# Patient Record
Sex: FEMALE | Race: WHITE | Employment: UNEMPLOYED | URBAN - METROPOLITAN AREA
[De-identification: names, ages, dates, MRNs, and addresses within clinical notes are randomized per-mention and may not be internally consistent; named-entity substitution may affect disease eponyms.]

---

## 2020-10-28 ENCOUNTER — APPOINTMENT (OUTPATIENT)
Dept: GENERAL RADIOLOGY | Facility: CLINIC | Age: 60
End: 2020-10-28
Payer: MEDICAID

## 2020-10-28 ENCOUNTER — HOSPITAL ENCOUNTER (EMERGENCY)
Facility: CLINIC | Age: 60
Discharge: HOME | End: 2020-10-28
Attending: EMERGENCY MEDICINE
Payer: MEDICAID

## 2020-10-28 VITALS
BODY MASS INDEX: 27.31 KG/M2 | RESPIRATION RATE: 14 BRPM | HEIGHT: 67 IN | SYSTOLIC BLOOD PRESSURE: 144 MMHG | TEMPERATURE: 98.4 F | WEIGHT: 174 LBS | HEART RATE: 88 BPM | DIASTOLIC BLOOD PRESSURE: 98 MMHG | OXYGEN SATURATION: 99 %

## 2020-10-28 LAB
ABSOLUTE EOS #: 0.1 K/UL (ref 0–0.4)
ABSOLUTE IMMATURE GRANULOCYTE: NORMAL K/UL (ref 0–0.3)
ABSOLUTE LYMPH #: 2.7 K/UL (ref 1–4.8)
ABSOLUTE MONO #: 0.5 K/UL (ref 0.1–1.2)
ANION GAP SERPL CALCULATED.3IONS-SCNC: 11 MMOL/L (ref 9–17)
BASOPHILS # BLD: 0 % (ref 0–2)
BASOPHILS ABSOLUTE: 0 K/UL (ref 0–0.2)
BUN BLDV-MCNC: 23 MG/DL (ref 8–23)
BUN/CREAT BLD: ABNORMAL (ref 9–20)
CALCIUM SERPL-MCNC: 9.2 MG/DL (ref 8.6–10.4)
CHLORIDE BLD-SCNC: 106 MMOL/L (ref 98–107)
CK MB: 3.6 NG/ML
CO2: 22 MMOL/L (ref 20–31)
CREAT SERPL-MCNC: 0.6 MG/DL (ref 0.5–0.9)
DIFFERENTIAL TYPE: NORMAL
EOSINOPHILS RELATIVE PERCENT: 1 % (ref 1–4)
GFR AFRICAN AMERICAN: >60 ML/MIN
GFR NON-AFRICAN AMERICAN: >60 ML/MIN
GFR SERPL CREATININE-BSD FRML MDRD: ABNORMAL ML/MIN/{1.73_M2}
GFR SERPL CREATININE-BSD FRML MDRD: ABNORMAL ML/MIN/{1.73_M2}
GLUCOSE BLD-MCNC: 102 MG/DL (ref 70–99)
HCT VFR BLD CALC: 40.5 % (ref 36–46)
HEMOGLOBIN: 13.5 G/DL (ref 12–16)
IMMATURE GRANULOCYTES: NORMAL %
LYMPHOCYTES # BLD: 35 % (ref 24–44)
MCH RBC QN AUTO: 31 PG (ref 26–34)
MCHC RBC AUTO-ENTMCNC: 33.3 G/DL (ref 31–37)
MCV RBC AUTO: 93.1 FL (ref 80–100)
MONOCYTES # BLD: 6 % (ref 2–11)
MYOGLOBIN: 22 NG/ML (ref 25–58)
NRBC AUTOMATED: NORMAL PER 100 WBC
PDW BLD-RTO: 12.6 % (ref 12.5–15.4)
PLATELET # BLD: 225 K/UL (ref 140–450)
PLATELET ESTIMATE: NORMAL
PMV BLD AUTO: 6.8 FL (ref 6–12)
POTASSIUM SERPL-SCNC: 3.7 MMOL/L (ref 3.7–5.3)
RBC # BLD: 4.35 M/UL (ref 4–5.2)
RBC # BLD: NORMAL 10*6/UL
SEG NEUTROPHILS: 58 % (ref 36–66)
SEGMENTED NEUTROPHILS ABSOLUTE COUNT: 4.4 K/UL (ref 1.8–7.7)
SODIUM BLD-SCNC: 139 MMOL/L (ref 135–144)
TOTAL CK: 64 U/L (ref 26–192)
TROPONIN INTERP: NORMAL
TROPONIN T: NORMAL NG/ML
TROPONIN, HIGH SENSITIVITY: 9 NG/L (ref 0–14)
WBC # BLD: 7.7 K/UL (ref 3.5–11)
WBC # BLD: NORMAL 10*3/UL

## 2020-10-28 PROCEDURE — 71045 X-RAY EXAM CHEST 1 VIEW: CPT

## 2020-10-28 PROCEDURE — 82553 CREATINE MB FRACTION: CPT

## 2020-10-28 PROCEDURE — 93005 ELECTROCARDIOGRAM TRACING: CPT | Performed by: EMERGENCY MEDICINE

## 2020-10-28 PROCEDURE — 83874 ASSAY OF MYOGLOBIN: CPT

## 2020-10-28 PROCEDURE — 36415 COLL VENOUS BLD VENIPUNCTURE: CPT

## 2020-10-28 PROCEDURE — 99284 EMERGENCY DEPT VISIT MOD MDM: CPT

## 2020-10-28 PROCEDURE — 85025 COMPLETE CBC W/AUTO DIFF WBC: CPT

## 2020-10-28 PROCEDURE — 80048 BASIC METABOLIC PNL TOTAL CA: CPT

## 2020-10-28 PROCEDURE — 82550 ASSAY OF CK (CPK): CPT

## 2020-10-28 PROCEDURE — 84484 ASSAY OF TROPONIN QUANT: CPT

## 2020-10-28 RX ORDER — TRAMADOL HYDROCHLORIDE 50 MG/1
50 TABLET ORAL EVERY 8 HOURS PRN
COMMUNITY
Start: 2019-11-09

## 2020-10-28 RX ORDER — AMLODIPINE BESYLATE 5 MG/1
TABLET ORAL
COMMUNITY
Start: 2020-10-13

## 2020-10-28 RX ORDER — DULOXETIN HYDROCHLORIDE 60 MG/1
CAPSULE, DELAYED RELEASE ORAL
COMMUNITY
Start: 2020-10-13

## 2020-10-28 RX ORDER — ASPIRIN 81 MG/1
TABLET, CHEWABLE ORAL
COMMUNITY
Start: 2020-09-13

## 2020-10-28 RX ORDER — TOPIRAMATE 50 MG/1
TABLET, FILM COATED ORAL
COMMUNITY
Start: 2020-08-19

## 2020-10-28 RX ORDER — CELECOXIB 200 MG/1
CAPSULE ORAL
COMMUNITY
Start: 2020-10-13

## 2020-10-28 RX ORDER — ATORVASTATIN CALCIUM 20 MG/1
TABLET, FILM COATED ORAL
COMMUNITY
Start: 2020-10-13

## 2020-10-28 RX ORDER — ARIPIPRAZOLE 2 MG/1
TABLET ORAL
COMMUNITY
Start: 2020-10-13

## 2020-10-28 ASSESSMENT — ENCOUNTER SYMPTOMS
SHORTNESS OF BREATH: 0
COUGH: 0

## 2020-10-28 NOTE — ED TRIAGE NOTES
Pt presents to ER with c/o neck pain , bilateral shoulder pain and epigastric burning. Pt was at home , c/o these s/s and called 911, EMS was on scene .  Pt decided to go by private vehicle to ER for further eval.

## 2020-10-28 NOTE — ED PROVIDER NOTES
[address] ED  EMERGENCY DEPARTMENT ENCOUNTER      Pt Name: Jorge Luis Paez  MRN: [id number]  [place] 1960  Date of evaluation: 10/28/2020  Provider: Elise Cifuentes MD    CHIEF COMPLAINT     Chief Complaint   Patient presents with    Neck Pain     pt daughter called 46, pt was worried about a heart attack. Pt refused transport and came by private vehicle with daughter to ER          HISTORY OF PRESENT ILLNESS   (Location/Symptom, Timing/Onset, Context/Setting,Quality, Duration, Modifying Factors, Severity)  Note limiting factors. Jorge Luis Paez is a 61 y.o. female who presents to the emergency department with chief complaint of pain in the back of her neck going in the upper back between the shoulder blades that started around 9 AM.  Pain was initially continuous and now intermittent. She does not report associated shortness of breath or diaphoresis. She denies chest pain. She does have heartburn and history of eosinophilic esophagitis. Patient is concerned if she is experiencing atypical symptoms of a myocardial infarction. The history is provided by the patient and medical records. Nursing Notes werereviewed. REVIEW OF SYSTEMS    (2-9 systems for level 4, 10 or more for level 5)     Review of Systems   Constitutional: Negative for diaphoresis, fatigue and fever. Respiratory: Negative for cough and shortness of breath. Musculoskeletal: Negative for myalgias. All other systems reviewed and are negative. Except as noted above the remainder of the review of systems was reviewed and negative.        PAST MEDICAL HISTORY     Past Medical History:   Diagnosis Date    Arthritis     Cerebral artery occlusion with cerebral infarction ([facility] [address]     Depression     Migraine          SURGICALHISTORY       Past Surgical History:   Procedure Laterality Date    LIGATION OF SAPHENOUS VEIN           CURRENT MEDICATIONS       Discharge Medication List as of 10/28/2020  3:40 PM      CONTINUE these medications which have NOT CHANGED    Details   aspirin 81 MG chewable tablet Take 1 tablet by mouth daily. Historical Med      DULoxetine (CYMBALTA) 60 MG extended release capsule Take 2 capsules by mouth every morning. Historical Med      amLODIPine (NORVASC) 5 MG tablet Take 1 tablet by mouth daily. Historical Med      ARIPiprazole (ABILIFY) 2 MG tablet Take 1 tablet by mouth daily. Historical Med      atorvastatin (LIPITOR) 20 MG tablet Take 1 tablet by mouth nightly. Historical Med      celecoxib (CELEBREX) 200 MG capsule Take 1 capsule by mouth twice daily. Historical Med      topiramate (TOPAMAX) 50 MG tablet Take 1 tablet by mouth twice daily. Historical Med      traMADol (ULTRAM) 50 MG tablet Take 50 mg by mouth every 8 hours as needed. Historical Med             ALLERGIES     Levonorgestrel-ethinyl estrad and Sulfa antibiotics    FAMILY HISTORY     History reviewed. No pertinent family history.        SOCIAL HISTORY       Social History     Socioeconomic History    Marital status:      Spouse name: None    Number of children: None    Years of education: None    Highest education level: None   Occupational History    None   Social Needs    Financial resource strain: None    Food insecurity     Worry: None     Inability: None    Transportation needs     Medical: None     Non-medical: None   Tobacco Use    Smoking status: Never Smoker    Smokeless tobacco: Never Used   Substance and Sexual Activity    Alcohol use: Yes     Comment: socially     Drug use: Never    Sexual activity: None   Lifestyle    Physical activity     Days per week: None     Minutes per session: None    Stress: None   Relationships    Social connections     Talks on phone: None     Gets together: None     Attends [employer] service: None     Active member of club or organization: None     Attends meetings of clubs or organizations: None     Relationship status: None    Intimate partner violence     Fear of current or ex partner: None     Emotionally abused: None     Physically abused: None     Forced sexual activity: None   Other Topics Concern    None   Social History Narrative    None       SCREENINGS    Levi Coma Scale  Eye Opening: Spontaneous  Best Verbal Response: Oriented  Best Motor Response: Obeys commands  Levi Coma Scale Score: 15        PHYSICAL EXAM    (up to 7 for level 4, 8 or more for level 5)     ED Triage Vitals   BP Temp Temp Source Pulse Resp SpO2 Height Weight   10/28/20 1357 10/28/20 1401 10/28/20 1401 10/28/20 1357 10/28/20 1401 10/28/20 1401 10/28/20 1357 10/28/20 1357   (!) 144/98 98.4 °F (36.9 °C) Oral 88 14 99 % 5' 7\" (1.702 m) 174 lb (78.9 kg)       Physical Exam  Vitals signs reviewed. Constitutional:       General: She is not in acute distress. HENT:      Head: Normocephalic. Right Ear: External ear normal.      Left Ear: External ear normal.      Nose: Nose normal.   Eyes:      Extraocular Movements: Extraocular movements intact. Neck:      Musculoskeletal: Neck supple. No muscular tenderness. Comments: At the time of my examination patient is not experiencing any pain. Cardiovascular:      Rate and Rhythm: Normal rate and regular rhythm. Pulmonary:      Effort: Pulmonary effort is normal.      Breath sounds: Normal breath sounds. Abdominal:      Palpations: Abdomen is soft. Tenderness: There is no abdominal tenderness. Musculoskeletal: Normal range of motion. General: No swelling or tenderness. Skin:     General: Skin is warm and dry. Coloration: Skin is not pale. Neurological:      General: No focal deficit present. Mental Status: She is alert and oriented to person, place, and time.    Psychiatric:         Mood and Affect: Mood normal.         DIAGNOSTIC RESULTS     EKG: All EKG's are interpreted by the Emergency Department Physician who either signs orCo-signs this chart in the absence of a cardiologist.    Normal sinus rhythm with rate of 74 beats a minute. Normal axis. No ischemic changes and no conduction defect. RADIOLOGY:   Non-plain film images such as CT, Ultrasound and MRI are read by the radiologist. Plain radiographic images are visualized and preliminarily interpreted by the emergency physician with the below findings:    Interpretation per the Radiologist below, ifavailable at the time of this note:    XR CHEST PORTABLE   Final Result   No acute pulmonary process. Loop recorder overlies the left upper chest.  Correlate with prior studies   for any change in positioning. ED BEDSIDE ULTRASOUND:   Performed by ED Physician - none    LABS:  Labs Reviewed   BASIC METABOLIC PANEL - Abnormal; Notable for the following components:       Result Value    Glucose 102 (*)     All other components within normal limits   MYOGLOBIN, SERUM - Abnormal; Notable for the following components:    Myoglobin 22 (*)     All other components within normal limits   CBC WITH AUTO DIFFERENTIAL   CK-MB   CK   TROPONIN       All other labs were within normal range ornot returned as of this dictation. EMERGENCY DEPARTMENT COURSE and DIFFERENTIAL DIAGNOSIS/MDM:   Vitals:    Vitals:    10/28/20 1357 10/28/20 1401   BP: (!) 144/98    Pulse: 88    Resp:  14   Temp:  98.4 °F (36.9 °C)   TempSrc:  Oral   SpO2:  99%   Weight: 78.9 kg (174 lb)    Height: 5' 7\" (1.702 m)             Cardiac work-up is negative. Patient is feeling improved and is discharged in stable condition to outpatient follow-up. MDM    CONSULTS:  None    PROCEDURES:  Unlessotherwise noted below, none     Procedures    FINAL IMPRESSION      1. Neck pain          DISPOSITION/PLAN   DISPOSITION Decision To Discharge 10/28/2020 03:39:39 PM      PATIENT REFERRED TO:  Liliana Pack  [address] [place] [address], [address]  [place]   [phone]      Follow up on Monday 10/2 as scheduled.     [facility]  [address] [place] [address] [id number]  [phone]    As needed, If symptoms worsen      DISCHARGE MEDICATIONS:         Problem List:  There is no problem list on file for this patient. Summation      Patient Course: Discharged. ED Medicationsadministered this visit:  Medications - No data to display    New Prescriptions from this visit:    Discharge Medication List as of 10/28/2020  3:40 PM          Follow-up:  Leon Henry  [address] [place] [address], [address]  [place]  [phone]      Follow up on Monday 10/2 as scheduled. [facility]  [address] [place] [address],[place] [address]  [phone]    As needed, If symptoms worsen        Final Impression:   1.  Neck pain               (Please note that portions of this note were completed with a voice recognitionprogram.  Efforts were made to edit the dictations but occasionally words are mis-transcribed.)    Hemalatha Higgins MD (electronically signed)  Attending Emergency Physician            Hemalatha Higgins MD  10/30/20 8240

## 2020-10-29 LAB
EKG ATRIAL RATE: 74 BPM
EKG P AXIS: 41 DEGREES
EKG P-R INTERVAL: 160 MS
EKG Q-T INTERVAL: 400 MS
EKG QRS DURATION: 84 MS
EKG QTC CALCULATION (BAZETT): 444 MS
EKG R AXIS: 15 DEGREES
EKG T AXIS: 46 DEGREES
EKG VENTRICULAR RATE: 74 BPM